# Patient Record
Sex: MALE | Race: WHITE
[De-identification: names, ages, dates, MRNs, and addresses within clinical notes are randomized per-mention and may not be internally consistent; named-entity substitution may affect disease eponyms.]

---

## 2019-02-08 ENCOUNTER — HOSPITAL ENCOUNTER (EMERGENCY)
Dept: HOSPITAL 47 - EC | Age: 34
LOS: 1 days | Discharge: HOME | End: 2019-02-09
Payer: COMMERCIAL

## 2019-02-08 VITALS — TEMPERATURE: 98.3 F

## 2019-02-08 DIAGNOSIS — F99: ICD-10-CM

## 2019-02-08 DIAGNOSIS — F17.200: ICD-10-CM

## 2019-02-08 DIAGNOSIS — F10.10: Primary | ICD-10-CM

## 2019-02-08 PROCEDURE — 82075 ASSAY OF BREATH ETHANOL: CPT

## 2019-02-08 PROCEDURE — 81003 URINALYSIS AUTO W/O SCOPE: CPT

## 2019-02-08 PROCEDURE — 80306 DRUG TEST PRSMV INSTRMNT: CPT

## 2019-02-08 PROCEDURE — 99284 EMERGENCY DEPT VISIT MOD MDM: CPT

## 2019-02-08 PROCEDURE — 93005 ELECTROCARDIOGRAM TRACING: CPT

## 2019-02-09 VITALS — DIASTOLIC BLOOD PRESSURE: 82 MMHG | SYSTOLIC BLOOD PRESSURE: 124 MMHG | HEART RATE: 96 BPM | RESPIRATION RATE: 16 BRPM

## 2019-02-09 LAB
PH UR: 5 [PH] (ref 5–8)
SP GR UR: 1 (ref 1–1.03)
UROBILINOGEN UR QL STRIP: <2 MG/DL (ref ?–2)

## 2019-02-09 NOTE — ED
Psych HPI





- General


Source: patient


Mode of arrival: ambulatory





- History of Present Illness


MD Complaint: other


Onset/Timin


-: week(s)


Associated Psychiatric Symptoms: depression, homicidal ideation


History of same: No


Quality: getting worse


Improves With: none


Worsens With: alcohol


Context: recent alcohol abuse


Associated Symptoms: denies other symptoms





<RonnellRicardo - Last Filed: 19 00:27>





<Jose Bernal - Last Filed: 19 11:20>





- General


Chief Complaint: Psychiatric Symptoms


Stated Complaint: mental health


Time Seen by Provider: 19 23:52





- History of Present Illness


Initial Comments: 





This patient is a 33-year-old man who presents with the complaint that he is 

having thoughts of hurting people.  The patient states that going back 2-3 

weeks now he is been getting increasingly depressed, and having thoughts of 

hurting people around him.  He denied specific homicidal ideation.  He denied 

suicidal ideation.  The patient states he has also been drinking a fair amount 

recently, 5-10 beers per day plus shots. (Ricardo Reynaga)





- Related Data


 Home Medications











 Medication  Instructions  Recorded  Confirmed


 


No Known Home Medications  19











 Allergies











Allergy/AdvReac Type Severity Reaction Status Date / Time


 


No Known Allergies Allergy   Verified 19 10:51














Review of Systems


ROS Other: All systems not noted in ROS Statement are negative.


Constitutional: Denies: fever


Respiratory: Denies: cough, dyspnea


Cardiovascular: Denies: chest pain, palpitations


Gastrointestinal: Denies: abdominal pain, vomiting, diarrhea


Genitourinary: Denies: dysuria, hematuria


Musculoskeletal: Denies: back pain


Skin: Denies: rash


Neurological: Denies: headache


Psychiatric: Reports: depression, homicidal thoughts.  Denies: auditory 

hallucinations, visual hallucinations, suicidal thoughts





<Ricardo Reynaga - Last Filed: 19 00:27>


ROS Other: All systems not noted in ROS Statement are negative.





<Jose Bernal - Last Filed: 19 11:20>


ROS Statement: 


Those systems with pertinent positive or pertinent negative responses have been 

documented in the HPI.








Past Medical History


Past Medical History: Asthma, Hypertension


Additional Past Medical History / Comment(s): back pain


History of Any Multi-Drug Resistant Organisms: None Reported


Past Surgical History: No Surgical Hx Reported


Past Psychological History: No Psychological Hx Reported


Smoking Status: Current every day smoker


Past Alcohol Use History: Daily


Past Drug Use History: None Reported





<Ricardo Reynaga - Last Filed: 19 00:27>





General Exam


Limitations: no limitations


General appearance: alert, in no apparent distress, appears intoxicated


Head exam: Present: atraumatic, normocephalic


Eye exam: Present: normal appearance.  Absent: scleral icterus, conjunctival 

injection


Respiratory exam: Present: normal lung sounds bilaterally.  Absent: respiratory 

distress, wheezes, rales, rhonchi, stridor


Cardiovascular Exam: Present: regular rate, normal rhythm, normal heart sounds.

  Absent: systolic murmur, diastolic murmur, rubs, gallop


GI/Abdominal exam: Present: soft.  Absent: distended, tenderness, guarding, 

rebound, mass


Extremities exam: Present: normal inspection.  Absent: pedal edema


Neurological exam: Present: alert


Psychiatric exam: Present: depressed, homicidal ideation.  Absent: agitated, 

anxious, flat affect, manic, suicidal ideation


Skin exam: Present: warm, dry, intact, normal color.  Absent: rash





<Ricardo Reynaga - Last Filed: 19 00:27>





 Vital Signs











  19





  23:40 05:48


 


Temperature 98.3 F 


 


Pulse Rate 115 H 96


 


Respiratory 20 16





Rate  


 


Blood Pressure 137/94 124/82


 


O2 Sat by Pulse 98 96





Oximetry  














- Lab Data





 Lab Results











  19 Range/Units





  23:55 


 


Urine Color  Colorless  


 


Urine Appearance  Clear  (Clear)  


 


Urine pH  5.0  (5.0-8.0)  


 


Ur Specific Gravity  1.000 L  (1.001-1.035)  


 


Urine Protein  Negative  (Negative)  


 


Urine Glucose (UA)  Negative  (Negative)  


 


Urine Ketones  Negative  (Negative)  


 


Urine Blood  Negative  (Negative)  


 


Urine Nitrite  Negative  (Negative)  


 


Urine Bilirubin  Negative  (Negative)  


 


Urine Urobilinogen  <2.0  (<2.0)  mg/dL


 


Ur Leukocyte Esterase  Negative  (Negative)  


 


Urine Opiates Screen  Not Detected  (NotDetected)  


 


Ur Oxycodone Screen  Not Detected  (NotDetected)  


 


Urine Methadone Screen  Not Detected  (NotDetected)  


 


Ur Propoxyphene Screen  Not Detected  (NotDetected)  


 


Ur Barbiturates Screen  Not Detected  (NotDetected)  


 


U Tricyclic Antidepress  Not Detected  (NotDetected)  


 


Ur Phencyclidine Scrn  Not Detected  (NotDetected)  


 


Ur Amphetamines Screen  Not Detected  (NotDetected)  


 


U Methamphetamines Scrn  Not Detected  (NotDetected)  


 


U Benzodiazepines Scrn  Not Detected  (NotDetected)  


 


Urine Cocaine Screen  Not Detected  (NotDetected)  


 


U Marijuana (THC) Screen  Not Detected  (NotDetected)  














Disposition





<Ricardo Reynaga - Last Filed: 19 00:27>


Is patient prescribed a controlled substance at d/c from ED?: No


Time of Disposition: 11:20





<Jose Bernal - Last Filed: 19 11:20>


Clinical Impression: 


 Alcohol abuse, Mood disturbance





Disposition: HOME SELF-CARE


Condition: Good


Instructions (If sedation given, give patient instructions):  Abuse of Alcohol (

ED)


Additional Instructions: 


Follow-up per [facility] directions


Referrals: 


Arya Roque MD [REFERRING] - 1-2 days

## 2021-05-26 ENCOUNTER — HOSPITAL ENCOUNTER (EMERGENCY)
Dept: HOSPITAL 47 - EC | Age: 36
Discharge: HOME | End: 2021-05-26
Payer: COMMERCIAL

## 2021-05-26 VITALS — DIASTOLIC BLOOD PRESSURE: 88 MMHG | SYSTOLIC BLOOD PRESSURE: 120 MMHG | HEART RATE: 88 BPM

## 2021-05-26 VITALS — TEMPERATURE: 97.7 F | RESPIRATION RATE: 16 BRPM

## 2021-05-26 DIAGNOSIS — I10: ICD-10-CM

## 2021-05-26 DIAGNOSIS — R07.89: Primary | ICD-10-CM

## 2021-05-26 DIAGNOSIS — J45.909: ICD-10-CM

## 2021-05-26 DIAGNOSIS — F17.200: ICD-10-CM

## 2021-05-26 DIAGNOSIS — I25.2: ICD-10-CM

## 2021-05-26 DIAGNOSIS — R11.10: ICD-10-CM

## 2021-05-26 LAB
ALBUMIN SERPL-MCNC: 3.7 G/DL (ref 3.5–5)
ALP SERPL-CCNC: 62 U/L (ref 38–126)
ALT SERPL-CCNC: 25 U/L (ref 4–49)
ANION GAP SERPL CALC-SCNC: 9 MMOL/L
APTT BLD: 23.4 SEC (ref 22–30)
AST SERPL-CCNC: 75 U/L (ref 17–59)
BASOPHILS # BLD AUTO: 0.2 K/UL (ref 0–0.2)
BASOPHILS NFR BLD AUTO: 2 %
BUN SERPL-SCNC: 4 MG/DL (ref 9–20)
CALCIUM SPEC-MCNC: 8.7 MG/DL (ref 8.4–10.2)
CHLORIDE SERPL-SCNC: 105 MMOL/L (ref 98–107)
CO2 SERPL-SCNC: 23 MMOL/L (ref 22–30)
EOSINOPHIL # BLD AUTO: 0.6 K/UL (ref 0–0.7)
EOSINOPHIL NFR BLD AUTO: 6 %
ERYTHROCYTE [DISTWIDTH] IN BLOOD BY AUTOMATED COUNT: 4.33 M/UL (ref 4.3–5.9)
ERYTHROCYTE [DISTWIDTH] IN BLOOD: 13.1 % (ref 11.5–15.5)
GLUCOSE SERPL-MCNC: 75 MG/DL (ref 74–99)
HCT VFR BLD AUTO: 43.3 % (ref 39–53)
HGB BLD-MCNC: 14.7 GM/DL (ref 13–17.5)
INR PPP: 0.9 (ref ?–1.2)
LYMPHOCYTES # SPEC AUTO: 3.5 K/UL (ref 1–4.8)
LYMPHOCYTES NFR SPEC AUTO: 34 %
MAGNESIUM SPEC-SCNC: 1.7 MG/DL (ref 1.6–2.3)
MCH RBC QN AUTO: 33.9 PG (ref 25–35)
MCHC RBC AUTO-ENTMCNC: 34 G/DL (ref 31–37)
MCV RBC AUTO: 99.9 FL (ref 80–100)
MONOCYTES # BLD AUTO: 0.6 K/UL (ref 0–1)
MONOCYTES NFR BLD AUTO: 6 %
NEUTROPHILS # BLD AUTO: 5.1 K/UL (ref 1.3–7.7)
NEUTROPHILS NFR BLD AUTO: 49 %
PLATELET # BLD AUTO: 205 K/UL (ref 150–450)
POTASSIUM SERPL-SCNC: 3.6 MMOL/L (ref 3.5–5.1)
PROT SERPL-MCNC: 6.4 G/DL (ref 6.3–8.2)
PT BLD: 9.7 SEC (ref 9–12)
SODIUM SERPL-SCNC: 137 MMOL/L (ref 137–145)
WBC # BLD AUTO: 10.4 K/UL (ref 3.8–10.6)

## 2021-05-26 PROCEDURE — 85025 COMPLETE CBC W/AUTO DIFF WBC: CPT

## 2021-05-26 PROCEDURE — 71046 X-RAY EXAM CHEST 2 VIEWS: CPT

## 2021-05-26 PROCEDURE — 84484 ASSAY OF TROPONIN QUANT: CPT

## 2021-05-26 PROCEDURE — 85610 PROTHROMBIN TIME: CPT

## 2021-05-26 PROCEDURE — 85730 THROMBOPLASTIN TIME PARTIAL: CPT

## 2021-05-26 PROCEDURE — 36415 COLL VENOUS BLD VENIPUNCTURE: CPT

## 2021-05-26 PROCEDURE — 83735 ASSAY OF MAGNESIUM: CPT

## 2021-05-26 PROCEDURE — 93005 ELECTROCARDIOGRAM TRACING: CPT

## 2021-05-26 PROCEDURE — 96374 THER/PROPH/DIAG INJ IV PUSH: CPT

## 2021-05-26 PROCEDURE — 99285 EMERGENCY DEPT VISIT HI MDM: CPT

## 2021-05-26 PROCEDURE — 80053 COMPREHEN METABOLIC PANEL: CPT

## 2021-05-26 NOTE — ED
Chest Pain HPI





- General


Chief Complaint: Chest Pain


Stated Complaint: Chest Pain


Time Seen by Provider: 21 21:03


Source: patient, EMS


Mode of arrival: EMS


Limitations: no limitations





- History of Present Illness


Initial Comments: 





This patient is a 35-year-old man who presents to be evaluated for chest pain 

that he states has been going on intermittently for one year.  Contrary to 

nursing notes he states it started in the morning today.


MD Complaint: chest pain


Onset/Timin


-: year(s)


Onset: during rest


Pain Location: left chest


Pain Radiation: none


Severity: moderate


Quality: aching


Consistency: intermittent


Improves With: nothing


Worsens With: nothing


Anginal Symptoms: vomiting


Treatments Prior to Arrival: none





- Related Data


                                Home Medications











 Medication  Instructions  Recorded  Confirmed


 


Pantoprazole Sodium [Protonix] 40 mg PO DAILY 21











                                    Allergies











Allergy/AdvReac Type Severity Reaction Status Date / Time


 


No Known Allergies Allergy   Verified 21 22:59














Review of Systems


ROS Statement: 


Those systems with pertinent positive or pertinent negative responses have been 

documented in the HPI.





ROS Other: All systems not noted in ROS Statement are negative.


Constitutional: Denies: fever, chills


Respiratory: Denies: cough, dyspnea


Cardiovascular: Reports: chest pain.  Denies: palpitations, orthopnea, edema, 

syncope


Gastrointestinal: Reports: vomiting.  Denies: abdominal pain, nausea, diarrhea, 

constipation, melena, hematochezia


Genitourinary: Denies: dysuria, hematuria


Musculoskeletal: Denies: back pain


Skin: Denies: rash


Neurological: Denies: headache, weakness, numbness





EKG Findings





- EKG Results:


EKG: interpreted by ZARA RASCON, sinus rhythm (Rate 90 bpm), normal axis, normal 

QRS, normal ST/T, no acute changes





Past Medical History


Past Medical History: Asthma, Hypertension, Myocardial Infarction (MI)


Additional Past Medical History / Comment(s): back pain


History of Any Multi-Drug Resistant Organisms: None Reported


Past Surgical History: No Surgical Hx Reported


Additional Past Surgical History / Comment(s): skin graft as child


Past Psychological History: No Psychological Hx Reported


Smoking Status: Current every day smoker


Past Alcohol Use History: Heavy


Past Drug Use History: None Reported





General Exam


Limitations: no limitations


General appearance: alert, in no apparent distress


Head exam: Present: atraumatic, normocephalic


Eye exam: Present: normal appearance.  Absent: scleral icterus, conjunctival 

injection


Neck exam: Present: normal inspection, full ROM


Respiratory exam: Present: normal lung sounds bilaterally, chest wall 

tenderness.  Absent: respiratory distress, wheezes, rales, rhonchi, stridor


Cardiovascular Exam: Present: regular rate, normal rhythm, normal heart sounds. 

 Absent: systolic murmur, diastolic murmur, rubs, gallop


GI/Abdominal exam: Present: soft, tenderness (Mild left upper quadrant 

tenderness no rebound or guarding).  Absent: distended, guarding, rebound, 

rigid, mass, pulsatile mass, hernia


Extremities exam: Present: normal inspection, normal capillary refill.  Absent: 

pedal edema, calf tenderness


Back exam: Present: normal inspection.  Absent: CVA tenderness (R), CVA 

tenderness (L)


Neurological exam: Present: alert


Skin exam: Present: warm, dry, intact, normal color.  Absent: rash





Course


                                   Vital Signs











  21





  20:51 22:17


 


Temperature 97.7 F 


 


Pulse Rate 92 


 


Pulse Rate [  75





Cardiac Monitor  





]  


 


Respiratory 16 





Rate  


 


Blood Pressure 130/91 


 


O2 Sat by Pulse 96 





Oximetry  














Disposition


Clinical Impression: 


 Chest pain





Disposition: HOME SELF-CARE


Condition: Good


Instructions (If sedation given, give patient instructions):  Chest Pain (ED)


Is patient prescribed a controlled substance at d/c from ED?: No


Referrals: 


Arya Roque MD [Primary Care Provider] - 1-2 days


Colleen Trevizo MD [STAFF PHYSICIAN] - 1-2 days

## 2021-11-10 ENCOUNTER — HOSPITAL ENCOUNTER (INPATIENT)
Dept: HOSPITAL 47 - EC | Age: 36
LOS: 1 days | Discharge: HOME | DRG: 897 | End: 2021-11-11
Attending: HOSPITALIST | Admitting: HOSPITALIST
Payer: COMMERCIAL

## 2021-11-10 DIAGNOSIS — F10.239: ICD-10-CM
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
## 2021-11-10 DIAGNOSIS — I10: ICD-10-CM

## 2021-11-10 DIAGNOSIS — R07.89: ICD-10-CM

## 2021-11-10 DIAGNOSIS — Z71.41: ICD-10-CM

## 2021-11-10 DIAGNOSIS — I25.2: ICD-10-CM

## 2021-11-10 DIAGNOSIS — K21.9: ICD-10-CM

## 2021-11-10 DIAGNOSIS — Z20.822: ICD-10-CM

## 2021-11-10 DIAGNOSIS — Z79.899: ICD-10-CM

## 2021-11-10 DIAGNOSIS — F10.229: Primary | ICD-10-CM

## 2021-11-10 DIAGNOSIS — Z82.49: ICD-10-CM

## 2021-11-10 DIAGNOSIS — R79.1: ICD-10-CM

## 2021-11-10 DIAGNOSIS — Z98.890: ICD-10-CM

## 2021-11-10 DIAGNOSIS — R00.0: ICD-10-CM

## 2021-11-10 DIAGNOSIS — Z86.73: ICD-10-CM

## 2021-11-10 DIAGNOSIS — F17.210: ICD-10-CM

## 2021-11-10 DIAGNOSIS — J45.909: ICD-10-CM

## 2021-11-10 LAB
ALBUMIN SERPL-MCNC: 4.2 G/DL (ref 3.5–5)
ALP SERPL-CCNC: 49 U/L (ref 38–126)
ALT SERPL-CCNC: 16 U/L (ref 4–49)
ANION GAP SERPL CALC-SCNC: 9 MMOL/L
APTT BLD: 24.6 SEC (ref 22–30)
AST SERPL-CCNC: 48 U/L (ref 17–59)
BASOPHILS # BLD AUTO: 0.1 K/UL (ref 0–0.2)
BASOPHILS NFR BLD AUTO: 1 %
BUN SERPL-SCNC: 5 MG/DL (ref 9–20)
CALCIUM SPEC-MCNC: 9.4 MG/DL (ref 8.4–10.2)
CHLORIDE SERPL-SCNC: 106 MMOL/L (ref 98–107)
CO2 SERPL-SCNC: 26 MMOL/L (ref 22–30)
EOSINOPHIL # BLD AUTO: 0.3 K/UL (ref 0–0.7)
EOSINOPHIL NFR BLD AUTO: 3 %
ERYTHROCYTE [DISTWIDTH] IN BLOOD BY AUTOMATED COUNT: 4.88 M/UL (ref 4.3–5.9)
ERYTHROCYTE [DISTWIDTH] IN BLOOD: 12.8 % (ref 11.5–15.5)
GLUCOSE SERPL-MCNC: 102 MG/DL (ref 74–99)
HCT VFR BLD AUTO: 49.2 % (ref 39–53)
HGB BLD-MCNC: 16.3 GM/DL (ref 13–17.5)
INR PPP: 0.9 (ref ?–1.2)
LIPASE SERPL-CCNC: 157 U/L (ref 23–300)
LYMPHOCYTES # SPEC AUTO: 2.3 K/UL (ref 1–4.8)
LYMPHOCYTES NFR SPEC AUTO: 24 %
MCH RBC QN AUTO: 33.5 PG (ref 25–35)
MCHC RBC AUTO-ENTMCNC: 33.3 G/DL (ref 31–37)
MCV RBC AUTO: 100.8 FL (ref 80–100)
MONOCYTES # BLD AUTO: 0.5 K/UL (ref 0–1)
MONOCYTES NFR BLD AUTO: 5 %
NEUTROPHILS # BLD AUTO: 6.2 K/UL (ref 1.3–7.7)
NEUTROPHILS NFR BLD AUTO: 63 %
PLATELET # BLD AUTO: 294 K/UL (ref 150–450)
POTASSIUM SERPL-SCNC: 4 MMOL/L (ref 3.5–5.1)
PROT SERPL-MCNC: 7.5 G/DL (ref 6.3–8.2)
PT BLD: 9.9 SEC (ref 9–12)
SODIUM SERPL-SCNC: 141 MMOL/L (ref 137–145)
WBC # BLD AUTO: 9.8 K/UL (ref 3.8–10.6)

## 2021-11-10 PROCEDURE — 85730 THROMBOPLASTIN TIME PARTIAL: CPT

## 2021-11-10 PROCEDURE — 78582 LUNG VENTILAT&PERFUS IMAGING: CPT

## 2021-11-10 PROCEDURE — 85379 FIBRIN DEGRADATION QUANT: CPT

## 2021-11-10 PROCEDURE — 99285 EMERGENCY DEPT VISIT HI MDM: CPT

## 2021-11-10 PROCEDURE — 80053 COMPREHEN METABOLIC PANEL: CPT

## 2021-11-10 PROCEDURE — 93306 TTE W/DOPPLER COMPLETE: CPT

## 2021-11-10 PROCEDURE — 81003 URINALYSIS AUTO W/O SCOPE: CPT

## 2021-11-10 PROCEDURE — 87635 SARS-COV-2 COVID-19 AMP PRB: CPT

## 2021-11-10 PROCEDURE — 85610 PROTHROMBIN TIME: CPT

## 2021-11-10 PROCEDURE — 84484 ASSAY OF TROPONIN QUANT: CPT

## 2021-11-10 PROCEDURE — 74177 CT ABD & PELVIS W/CONTRAST: CPT

## 2021-11-10 PROCEDURE — 80320 DRUG SCREEN QUANTALCOHOLS: CPT

## 2021-11-10 PROCEDURE — 80048 BASIC METABOLIC PNL TOTAL CA: CPT

## 2021-11-10 PROCEDURE — 71046 X-RAY EXAM CHEST 2 VIEWS: CPT

## 2021-11-10 PROCEDURE — 36415 COLL VENOUS BLD VENIPUNCTURE: CPT

## 2021-11-10 PROCEDURE — 85025 COMPLETE CBC W/AUTO DIFF WBC: CPT

## 2021-11-10 PROCEDURE — 83690 ASSAY OF LIPASE: CPT

## 2021-11-10 PROCEDURE — 96374 THER/PROPH/DIAG INJ IV PUSH: CPT

## 2021-11-10 PROCEDURE — 83605 ASSAY OF LACTIC ACID: CPT

## 2021-11-10 PROCEDURE — 93005 ELECTROCARDIOGRAM TRACING: CPT

## 2021-11-10 RX ADMIN — METOPROLOL TARTRATE SCH MG: 25 TABLET, FILM COATED ORAL at 20:28

## 2021-11-10 RX ADMIN — CEFAZOLIN SCH MLS/HR: 330 INJECTION, POWDER, FOR SOLUTION INTRAMUSCULAR; INTRAVENOUS at 20:29

## 2021-11-10 NOTE — ED
Abdominal Pain HPI





- General


Chief Complaint: Abdominal Pain


Stated Complaint: chest pain


Source: patient


Mode of arrival: ambulatory


Limitations: no limitations





- History of Present Illness


Initial Comments: 





36-year-old male with past history of asthma, hypertension, CVA and 2 reported 

MIs who presents to the emergency department as reported chest pain.  States 

that the pain is located over the left side of his chest wall and into the left 

upper quadrant of his abdomen.  Pain has been going on for the past month 

however states that it became acutely worse today.  He also feels fatigued.  

States he's been sleeping all day.  He denies cough, fevers or congestion.  

Admits to nausea without vomiting.  No history of abdominal surgeries.  Denies 

history of peptic ulcer disease.  Patient denies alcohol intake.  Only admits to

tobacco use.  No history of EGD.  He denies hematemesis.  Admits diarrhea.  No 

black or bloody stools.  Patient arrives with a high heart rate.  No other 

alleviating, precipitating or modifying factors





- Related Data


                                Home Medications











 Medication  Instructions  Recorded  Confirmed


 


Pantoprazole Sodium [Protonix] 40 mg PO DAILY 05/26/21 11/10/21











                                    Allergies











Allergy/AdvReac Type Severity Reaction Status Date / Time


 


No Known Allergies Allergy   Verified 11/10/21 16:55














Review of Systems


ROS Statement: 


Those systems with pertinent positive or pertinent negative responses have been 

documented in the HPI.





ROS Other: All systems not noted in ROS Statement are negative.





Past Medical History


Past Medical History: Asthma, Hypertension, Myocardial Infarction (MI)


Additional Past Medical History / Comment(s): back pain


History of Any Multi-Drug Resistant Organisms: None Reported


Past Surgical History: No Surgical Hx Reported


Additional Past Surgical History / Comment(s): skin graft as child


Past Psychological History: No Psychological Hx Reported


Smoking Status: Current every day smoker


Past Alcohol Use History: Occasional


Past Drug Use History: None Reported





General Exam


Limitations: no limitations





Course


                                   Vital Signs











  11/10/21 11/10/21 11/10/21





  16:05 19:02 20:32


 


Temperature 98.5 F  


 


Pulse Rate 124 H 116 H 107 H


 


Respiratory 20 18 18





Rate   


 


Blood Pressure 144/85 140/97 138/94


 


O2 Sat by Pulse 99 98 98





Oximetry   














Medical Decision Making





- Medical Decision Making





On arrival patient is placed into room 23.  A thorough history and physical exam

 is performed.  IV is established.  Patient is given a liter bolus of normal 

saline.  He does smell of alcohol.  Laboratory studies are obtained and 

demonstrated an elevated d-dimer of 0.71 and alcohol of 223.  He went over for a

 CT of his abdomen and pelvis before d-dimer came back.  CT demonstrated no 

acute findings.  Chest x-ray demonstrated no acute findings.  The patient does 

have persistent tachycardia however improved to a rate of 110.  Due to his 

elevated d-dimer I do feel that the patient needs a lung perfusion scan due to 

chest pain and persistent tachycardia.  He she will be given a dose of Lovenox 

at this time with lung perfusion scan pending.  Recommended admission for which 

Dr. Colon accepted and agreed to treatment plan.  He is currently awaiting a 

bed on the floor





- Lab Data


Result diagrams: 


                                 11/10/21 17:26





                                 11/10/21 17:26


                                   Lab Results











  11/10/21 11/10/21 11/10/21 Range/Units





  17:26 17:26 17:26 


 


WBC  9.8    (3.8-10.6)  k/uL


 


RBC  4.88    (4.30-5.90)  m/uL


 


Hgb  16.3    (13.0-17.5)  gm/dL


 


Hct  49.2    (39.0-53.0)  %


 


MCV  100.8 H    (80.0-100.0)  fL


 


MCH  33.5    (25.0-35.0)  pg


 


MCHC  33.3    (31.0-37.0)  g/dL


 


RDW  12.8    (11.5-15.5)  %


 


Plt Count  294    (150-450)  k/uL


 


MPV  7.1    


 


Neutrophils %  63    %


 


Lymphocytes %  24    %


 


Monocytes %  5    %


 


Eosinophils %  3    %


 


Basophils %  1    %


 


Neutrophils #  6.2    (1.3-7.7)  k/uL


 


Lymphocytes #  2.3    (1.0-4.8)  k/uL


 


Monocytes #  0.5    (0-1.0)  k/uL


 


Eosinophils #  0.3    (0-0.7)  k/uL


 


Basophils #  0.1    (0-0.2)  k/uL


 


PT   9.9   (9.0-12.0)  sec


 


INR   0.9   (<1.2)  


 


APTT   24.6   (22.0-30.0)  sec


 


D-Dimer   0.71 H   (<0.60)  mg/L FEU


 


Sodium    141  (137-145)  mmol/L


 


Potassium    4.0  (3.5-5.1)  mmol/L


 


Chloride    106  ()  mmol/L


 


Carbon Dioxide    26  (22-30)  mmol/L


 


Anion Gap    9  mmol/L


 


BUN    5 L  (9-20)  mg/dL


 


Creatinine    0.67  (0.66-1.25)  mg/dL


 


Est GFR (CKD-EPI)AfAm    >90  (>60 ml/min/1.73 sqM)  


 


Est GFR (CKD-EPI)NonAf    >90  (>60 ml/min/1.73 sqM)  


 


Glucose    102 H  (74-99)  mg/dL


 


Plasma Lactic Acid [place]     (0.7-2.0)  mmol/L


 


Calcium    9.4  (8.4-10.2)  mg/dL


 


Total Bilirubin    0.3  (0.2-1.3)  mg/dL


 


AST    48  (17-59)  U/L


 


ALT    16  (4-49)  U/L


 


Alkaline Phosphatase    49  ()  U/L


 


Troponin I     (0.000-0.034)  ng/mL


 


Total Protein    7.5  (6.3-8.2)  g/dL


 


Albumin    4.2  (3.5-5.0)  g/dL


 


Lipase    157  ()  U/L


 


Serum Alcohol    223 H*  mg/dL














  11/10/21 11/10/21 Range/Units





  17:26 17:26 


 


WBC    (3.8-10.6)  k/uL


 


RBC    (4.30-5.90)  m/uL


 


Hgb    (13.0-17.5)  gm/dL


 


Hct    (39.0-53.0)  %


 


MCV    (80.0-100.0)  fL


 


MCH    (25.0-35.0)  pg


 


MCHC    (31.0-37.0)  g/dL


 


RDW    (11.5-15.5)  %


 


Plt Count    (150-450)  k/uL


 


MPV    


 


Neutrophils %    %


 


Lymphocytes %    %


 


Monocytes %    %


 


Eosinophils %    %


 


Basophils %    %


 


Neutrophils #    (1.3-7.7)  k/uL


 


Lymphocytes #    (1.0-4.8)  k/uL


 


Monocytes #    (0-1.0)  k/uL


 


Eosinophils #    (0-0.7)  k/uL


 


Basophils #    (0-0.2)  k/uL


 


PT    (9.0-12.0)  sec


 


INR    (<1.2)  


 


APTT    (22.0-30.0)  sec


 


D-Dimer    (<0.60)  mg/L FEU


 


Sodium    (137-145)  mmol/L


 


Potassium    (3.5-5.1)  mmol/L


 


Chloride    ()  mmol/L


 


Carbon Dioxide    (22-30)  mmol/L


 


Anion Gap    mmol/L


 


BUN    (9-20)  mg/dL


 


Creatinine    (0.66-1.25)  mg/dL


 


Est GFR (CKD-EPI)AfAm    (>60 ml/min/1.73 sqM)  


 


Est GFR (CKD-EPI)NonAf    (>60 ml/min/1.73 sqM)  


 


Glucose    (74-99)  mg/dL


 


Plasma Lactic Acid [place]  2.0   (0.7-2.0)  mmol/L


 


Calcium    (8.4-10.2)  mg/dL


 


Total Bilirubin    (0.2-1.3)  mg/dL


 


AST    (17-59)  U/L


 


ALT    (4-49)  U/L


 


Alkaline Phosphatase    ()  U/L


 


Troponin I   <0.012  (0.000-0.034)  ng/mL


 


Total Protein    (6.3-8.2)  g/dL


 


Albumin    (3.5-5.0)  g/dL


 


Lipase    ()  U/L


 


Serum Alcohol    mg/dL














- EKG Data


EKG Comments: 





EKG demonstrates sinus tachycardia with a rate of 113. CO interval 132.  She has

 90.  QTC of 474.  No acute ST segment elevations or depressions





Disposition


Clinical Impression: 


 Chest pain, Tachycardia, Elevated d-dimer





Disposition: ADMITTED AS IP TO THIS HOSP


Condition: Stable


Is patient prescribed a controlled substance at d/c from ED?: No


Decision to Admit Reason: Admit from EC


Decision Date: 11/10/21


Decision Time: 19:48

## 2021-11-10 NOTE — XR
EXAMINATION TYPE: XR chest 2V

 

DATE OF EXAM: 11/10/2021

 

COMPARISON: 5/26/2021

 

HISTORY: Abdominal pain

 

TECHNIQUE: 2 views

 

FINDINGS: Heart and mediastinum are normal. Lungs are clear. Diaphragm is normal. Bony thorax is inta
ct. There are chest leads.

 

IMPRESSION: Normal chest. No change.

## 2021-11-11 VITALS
SYSTOLIC BLOOD PRESSURE: 124 MMHG | HEART RATE: 93 BPM | DIASTOLIC BLOOD PRESSURE: 77 MMHG | TEMPERATURE: 98.3 F | RESPIRATION RATE: 13 BRPM

## 2021-11-11 LAB
ANION GAP SERPL CALC-SCNC: 3 MMOL/L
BASOPHILS # BLD AUTO: 0.1 K/UL (ref 0–0.2)
BASOPHILS NFR BLD AUTO: 1 %
BUN SERPL-SCNC: 3 MG/DL (ref 9–20)
CALCIUM SPEC-MCNC: 8.5 MG/DL (ref 8.4–10.2)
CHLORIDE SERPL-SCNC: 106 MMOL/L (ref 98–107)
CO2 SERPL-SCNC: 27 MMOL/L (ref 22–30)
EOSINOPHIL # BLD AUTO: 0.3 K/UL (ref 0–0.7)
EOSINOPHIL NFR BLD AUTO: 2 %
ERYTHROCYTE [DISTWIDTH] IN BLOOD BY AUTOMATED COUNT: 4.28 M/UL (ref 4.3–5.9)
ERYTHROCYTE [DISTWIDTH] IN BLOOD: 12.8 % (ref 11.5–15.5)
GLUCOSE SERPL-MCNC: 86 MG/DL (ref 74–99)
HCT VFR BLD AUTO: 43.5 % (ref 39–53)
HGB BLD-MCNC: 14.6 GM/DL (ref 13–17.5)
LYMPHOCYTES # SPEC AUTO: 2.8 K/UL (ref 1–4.8)
LYMPHOCYTES NFR SPEC AUTO: 24 %
MCH RBC QN AUTO: 34.2 PG (ref 25–35)
MCHC RBC AUTO-ENTMCNC: 33.6 G/DL (ref 31–37)
MCV RBC AUTO: 101.8 FL (ref 80–100)
MONOCYTES # BLD AUTO: 0.6 K/UL (ref 0–1)
MONOCYTES NFR BLD AUTO: 5 %
NEUTROPHILS # BLD AUTO: 7.7 K/UL (ref 1.3–7.7)
NEUTROPHILS NFR BLD AUTO: 66 %
PH UR: 6 [PH] (ref 5–8)
PLATELET # BLD AUTO: 254 K/UL (ref 150–450)
POTASSIUM SERPL-SCNC: 3.6 MMOL/L (ref 3.5–5.1)
SODIUM SERPL-SCNC: 136 MMOL/L (ref 137–145)
SP GR UR: 1.03 (ref 1–1.03)
UROBILINOGEN UR QL STRIP: <2 MG/DL (ref ?–2)
WBC # BLD AUTO: 11.6 K/UL (ref 3.8–10.6)

## 2021-11-11 RX ADMIN — CEFAZOLIN SCH: 330 INJECTION, POWDER, FOR SOLUTION INTRAMUSCULAR; INTRAVENOUS at 03:57

## 2021-11-11 RX ADMIN — METOPROLOL TARTRATE SCH MG: 25 TABLET, FILM COATED ORAL at 08:10

## 2021-11-11 NOTE — P.CRDCN
History of Present Illness


History of present illness: 


HISTORY OF PRESENTING ILLNESS


This is a pleasant 36-year-old male past medical history significant for asthma,

alcohol use, chronic nicotine dependence (smokes 1PPD), hypertension (states he 

is not on medication), 3 strokes per patient (unknown details), possible cardiac

arrest in [place] he states 1-2 years ago? Patient unable to give accurate 

details of this incident. He does not follow with a cardiologist.  We have been 

asked to see in consultation for chest pain. Patient presents emergency 

department with left upper quadrant abdominal pain for about 2 weeks that has 

progressively been getting worse. He states it is aggravated by activity and 

palpation to his LUQ. His pain is non-radiating. He denies chest pain. He states

it is associated with mild shortness of breath and nausea. He also does endorse 

symptoms of orthopnea. He denies history of coronary artery disease or stent 

placements, heart failure or diabetes. Family history includes grandfather had 

an MI unsure of what age. He states about 1-2 years ago he was in [place] and 

was told his "heart stopped" he is unaware of any details. He is a current every

day smoker smokes 1PPD. He states he does drink alcohol about 6pack Friday and 

Saturday, however he does state he did drink a 12oz beer yesterday. He denies 

daily alcohol use. Denies illicit drug use. Patient plan to go for 


VQ scan today





DIAGNOSTICS


EKG reveals sinus tachycardia, heart rate 113, LVH, no significant ST-T wave 

abnormalities


Telemetry tracings indicate sinus mechanism HR 80s-low 100s. 


CT abdomen and Pelvis- no acute findings 


Chest xray no acute cardiopulmonary process


Laboratory reviewed, WBC 11.6, hemoglobin 14.6, platelets 254, sodium 136, 

potassium 3.6, BUN 3, serum current 0.5, troponin negative 3, UA negative, 

serum alcohol 223, COVID-19 PCR negative


Current home medications include Protonix.  Patient started on PRN ativan and 

metoprolol tartrate 12.5mg BID. 





REVIEW OF SYSTEMS


At the time of my exam:


CONSTITUTIONAL: Denies fever or chills.


CARDIOVASCULAR: +shortness of breath, +orthopnea Denies chest pain, PND or 

palpitations.


RESPIRATORY: Denies cough. 


GASTROINTESTINAL: +LUQ abdominal pain, Denies diarrhea, constipation, nausea or 

vomiting.


MUSCULOSKELETAL: Denies myalgias.


NEUROLOGIC: Denies numbness, tingling, headacbe or weakness.


ENDOCRINE: Denies fatigue, weight change,  polydipsia or polyurina.


GENITOURINARY: Denies burning, hematuria or urgency with micturation.


HEMATOLOGIC: Denies history of anemia or bleeding. 





PHYSICAL EXAMINATION


Blood pressure 124/77, heart rate 93, afebrile oxygen saturation 94% on room air


CONSTITUTIONAL: No apparent distress. 


HEENT: Head is normocephalic. Pupils are equal, round. Sclerae anicteric. Mucous

membranes of the mouth are moist.  No JVD. No carotid bruit.


CHEST EXAMINATION: Lungs are clear to auscultation. No chest wall tenderness is 

noted on palpation or with deep breathing. 


HEART EXAMINATION: Regular, tachycardic  rate and rhythm. S1, S2 heard. No 

murmurs, gallops or rub.


ABDOMEN: Soft, nontender. Positive bowel sounds.


EXTREMITIES: 2+ peripheral pulses, no lower extremity edema and no calf 

tenderness.


NEUROLOGIC EXAMINATION: Patient is awake, alert and oriented x3. 





ASSESSMENT


LUQ abdominal pain 


Sinus Tachycardia


History of Asthma 


History of hypertension


Alcohol use


Alcohol withdrawal 


Chronic nicotine dependence


History of CVA- patient reports history of 3 CVAs, unable to give details  





PLAN


An acute coronary event has been ruled out with no EKG evidence of ischemia and 

negative cardiac enzymes. Patient denies any chest pain on exam. 


Obtain 2D echocardiogram and doppler study to assess cardiac structure and 

function. 


Smoking and alcohol cessation discussed and highly recommended. 


If echocardiogram with no acute findings, no further cardiac workup indicated at

this time. 


Rest of management per primary. 


Thank you kindly for this consultation. 








Nurse Practitioner note has been reviewed, I agree with a documented findings 

and plan of care.  Patient was seen and examined.











Past Medical History


Past Medical History: Asthma, Hypertension, Myocardial Infarction (MI)


Additional Past Medical History / Comment(s): back pain


Last Myocardial Infarction Date:: 2020


History of Any Multi-Drug Resistant Organisms: None Reported


Past Surgical History: No Surgical Hx Reported


Additional Past Surgical History / Comment(s): skin graft as child


Past Psychological History: No Psychological Hx Reported


Smoking Status: Current every day smoker


Past Alcohol Use History: Occasional


Past Drug Use History: None Reported





Medications and Allergies


                                Home Medications











 Medication  Instructions  Recorded  Confirmed  Type


 


Pantoprazole Sodium [Protonix] 40 mg PO DAILY 05/26/21 11/10/21 History








                                    Allergies











Allergy/AdvReac Type Severity Reaction Status Date / Time


 


No Known Allergies Allergy   Verified 11/10/21 16:55














Physical Exam


Vitals: 


                                   Vital Signs











  Temp Pulse Pulse Resp BP BP Pulse Ox


 


 11/11/21 06:51  98.3 F   93  13   124/77  94 L


 


 11/11/21 01:39  98.1 F   97  20   133/70  92 L


 


 11/10/21 23:10  98.3 F   99  22   160/93  97


 


 11/10/21 20:32   107 H   18  138/94   98


 


 11/10/21 19:02   116 H   18  140/97   98


 


 11/10/21 16:05  98.5 F  124 H   20  144/85   99








                                Intake and Output











 11/10/21 11/11/21 11/11/21





 22:59 06:59 14:59


 


Other:   


 


  Voiding Method  Toilet 





  Urinal 


 


  # Voids  2 


 


  Weight 71.668 kg  














Results





                                 11/11/21 03:16





                                 11/11/21 03:16


                                 Cardiac Enzymes











  11/10/21 11/10/21 11/10/21 Range/Units





  17:26 17:26 21:13 


 


AST  48    (17-59)  U/L


 


Troponin I   <0.012  <0.012  (0.000-0.034)  ng/mL














  11/11/21 Range/Units





  03:16 


 


AST   (17-59)  U/L


 


Troponin I  <0.012  (0.000-0.034)  ng/mL








                                   Coagulation











  11/10/21 Range/Units





  17:26 


 


PT  9.9  (9.0-12.0)  sec


 


APTT  24.6  (22.0-30.0)  sec








                                       CBC











  11/10/21 11/11/21 Range/Units





  17:26 03:16 


 


WBC  9.8  11.6 H  (3.8-10.6)  k/uL


 


RBC  4.88  4.28 L  (4.30-5.90)  m/uL


 


Hgb  16.3  14.6  (13.0-17.5)  gm/dL


 


Hct  49.2  43.5  (39.0-53.0)  %


 


Plt Count  294  254  (150-450)  k/uL








                          Comprehensive Metabolic Panel











  11/10/21 11/11/21 Range/Units





  17:26 03:16 


 


Sodium  141  136 L  (137-145)  mmol/L


 


Potassium  4.0  3.6  (3.5-5.1)  mmol/L


 


Chloride  106  106  ()  mmol/L


 


Carbon Dioxide  26  27  (22-30)  mmol/L


 


BUN  5 L  3 L  (9-20)  mg/dL


 


Creatinine  0.67  0.59 L  (0.66-1.25)  mg/dL


 


Glucose  102 H  86  (74-99)  mg/dL


 


Calcium  9.4  8.5  (8.4-10.2)  mg/dL


 


AST  48   (17-59)  U/L


 


ALT  16   (4-49)  U/L


 


Alkaline Phosphatase  49   ()  U/L


 


Total Protein  7.5   (6.3-8.2)  g/dL


 


Albumin  4.2   (3.5-5.0)  g/dL








                               Current Medications











Generic Name Dose Route Start Last Admin





  Trade Name Freq  PRN Reason Stop Dose Admin


 


Diazepam  2 mg  11/10/21 22:00  11/11/21 08:10





  Diazepam 2 Mg Tab  PO   2 mg





  TID MUKESH   Administration


 


Sodium Chloride  1,000 mls @ 130 mls/hr  11/10/21 20:00  11/11/21 03:57





  Saline 0.9%  IV   Not Given





  .[id number] MUKESH  


 


Lorazepam  1 mg  11/10/21 19:46  11/10/21 23:30





  Lorazepam 2 Mg/Ml Inj  IV   1 mg





  Q2HR PRN   Administration





  CIWA 8 or 9  


 


Lorazepam  1 mg  11/10/21 19:46 





  Lorazepam 2 Mg/Ml Inj  IV  





  Q1HR PRN  





  CIWA 10 to 15  


 


Lorazepam  2 mg  11/10/21 19:46 





  Lorazepam 2 Mg/Ml Inj  IV  11/12/21 19:47 





  Q10M PRN  





  CIWA 16 or higher  


 


Metoprolol Tartrate  12.5 mg  11/10/21 21:00  11/11/21 08:10





  Metoprolol Tartrate 12.5 Mg Tab  PO   12.5 mg





  BID MUKESH   Administration


 


Naloxone HCl  0.2 mg  11/10/21 19:48 





  Naloxone 0.4 Mg/Ml 1 Ml Vial  IV  





  Q2M PRN  





  Opioid Reversal  


 


Thiamine HCl  100 mg  11/11/21 07:30  11/11/21 08:11





  Thiamine 100 Mg Tab  PO   100 mg





  BID-W/MEALS MUKESH   Administration








                                Intake and Output











 11/10/21 11/11/21 11/11/21





 22:59 06:59 14:59


 


Other:   


 


  Voiding Method  Toilet 





  Urinal 


 


  # Voids  2 


 


  Weight 71.668 kg  








                                        





                                 11/11/21 03:16 





                                 11/11/21 03:16

## 2021-11-11 NOTE — P.HPIM
History of Present Illness


H&P Date: 11/11/21


Chief Complaint: Chest pain





This is a pleasant 6-year-old patient of Dr. Roque.  Patient long-standing 

smoker.  Has been drinking alcohol since the age of about 8.  Drinks about 612 

ounce beers average per day.  Works as a [employer]  Patient presented 

yesterday after feeling tired dizzy.  It was described as chest pressure but 

actually was complaining of discomfort in the epigastric area.  Patient did run 

out of his Protonix.  Has been having reflux symptoms.  No precordial pain.  

Patient's alcohol level in the ER was 223.  Patient only eats one meal a day.  H

daniela was having early alcohol withdrawal symptoms.  Was put on Valium and CIWA 

scale in the ER.  This morning feeling better.  Very slight tremors.  Able to 

tolerate some breakfast.  No chest pain.





Review of systems:


GEN.:  Tired on presentation


EYES: None


HEENT: None


NECK: None


RESPIRATORY: None


CARDIOVASCULAR: None


GASTROINTESTINAL: Reflux


GENITOURINARY: None


MUSCULOSKELETAL: None


LYMPHATICS: None


HEMATOLOGICAL: None  


PSYCHIATRY: None


NEUROLOGICAL: Mild tremors





Past medical history to include:


Asthma, hypertension questionable, questionable MI.





Social history:


Lives with his sister and mother.  Works as a ".  Smokes a pack a day 

for about 28 years.  Has been drinking since the age of 8 about 6 beers a day 

of 12 ounce.





Family history:


Reviewed, noncontributory to presentation





Physical examination:


VITAL SIGNS: 98.5, 124, 20, 140/85, 99% room air upon presentation


GENERAL: BMI 24, sitting up in bed, awake.


EYES: Pupils equal.  Conjunctiva normal.


HEENT: External appearance of nose and ears normal, oral cavity grossly normal.


NECK: JVD not raised; masses not palpable.


HEART: First and second heart sounds are normal;  no edema.  


LUNGS: Respiratory rate normal; clear to auscultation.  


ABDOMEN: Soft,  nontender, liver spleen not palpable, no masses palpable.  


PSYCH: Alert and oriented x3;  mood  and affect slightly anxiousl.  


NEUROLOGICAL: Cranial nerves grossly intact; no facial asymmetry,   power and 

sensation grossly intact mild tremor of the outstretched hand. 


LYMPHATICS: No lymph nodes palpable in the axilla and neck





INVESTIGATIONS, reviewed in the clinical context:


White count 9.81 was 16.3 platelets 24 potassium 4 BUN 5 creatinine 0.67


Troponin I 3 negative


Lipase 157


UA: Negative


Serum alcohol 223


Coronavirus [PCL]: Not detected


EKG tracing personally reviewed by me-normal sinus rhythm.


Chest x-ray film personally reviewed by me-no infiltrates


CT abdomen pelvis: Negative





Assessment and plan:





-Early alcohol withdrawal syndrome


Valium 2 mg 3 times a day.  CIWA scale.  Lopressor 12.5 by mouth twice a day to 

cut back on [address]





-Acute alcohol intoxication with level of 223 on presentation





-Chronic alcohol dependence disorder


Patient counseled





-Chronic nicotine dependence, cigarette smoking


Nicotine patch 21





-GERD, uncontrolled


Protonix.  Avoid alcohol





Patient was put on Valium.  CIWA scale.  Nicotine patch.  Protonix.  Counseled. 

 Cardiology consulted.  2-D echo ordered.





Smoke cessation counseling:


This was done with the patient.  Nicotine patch is being given.  More than 3 

minutes was spent for this





Past Medical History


Past Medical History: Asthma, Hypertension, Myocardial Infarction (MI)


Additional Past Medical History / Comment(s): back pain


Last Myocardial Infarction Date:: 2020


History of Any Multi-Drug Resistant Organisms: None Reported


Past Surgical History: No Surgical Hx Reported


Additional Past Surgical History / Comment(s): skin graft as child


Past Psychological History: No Psychological Hx Reported


Smoking Status: Current every day smoker


Past Alcohol Use History: Occasional


Past Drug Use History: None Reported





Medications and Allergies


                                Home Medications











 Medication  Instructions  Recorded  Confirmed  Type


 


Pantoprazole Sodium [Protonix] 40 mg PO DAILY 05/26/21 11/10/21 History


 


Metoprolol Tartrate [Lopressor] 12.5 mg PO BID #6 tab 11/11/21  Rx


 


Nicotine 21Mg/24Hr Patch [Habitrol] 1 each TRANSDERM DAILY #14 patch 11/11/21  

Rx


 


Thiamine [Vitamin B-1] 100 mg PO BID-W/MEALS #60 tab 11/11/21  Rx








                                    Allergies











Allergy/AdvReac Type Severity Reaction Status Date / Time


 


No Known Allergies Allergy   Verified 11/10/21 16:55














Physical Exam


Vitals: 


                                   Vital Signs











  Temp Pulse Pulse Resp BP BP Pulse Ox


 


 11/11/21 06:51  98.3 F   93  13   124/77  94 L


 


 11/11/21 01:39  98.1 F   97  20   133/70  92 L


 


 11/10/21 23:10  98.3 F   99  22   160/93  97


 


 11/10/21 20:32   107 H   18  138/94   98


 


 11/10/21 19:02   116 H   18  140/97   98


 


 11/10/21 16:05  98.5 F  124 H   20  144/85   99








                                Intake and Output











 11/10/21 11/11/21 11/11/21





 22:59 06:59 14:59


 


Other:   


 


  Voiding Method  Toilet 





  Urinal 


 


  # Voids  2 


 


  Weight 71.668 kg  














Results


CBC & Chem 7: 


                                 11/11/21 03:16





                                 11/11/21 03:16


Labs: 


                  Abnormal Lab Results - Last 24 Hours (Table)











  11/10/21 11/10/21 11/10/21 Range/Units





  17:26 17:26 17:26 


 


WBC     (3.8-10.6)  k/uL


 


RBC     (4.30-5.90)  m/uL


 


MCV  100.8 H    (80.0-100.0)  fL


 


D-Dimer   0.71 H   (<0.60)  mg/L FEU


 


Sodium     (137-145)  mmol/L


 


BUN    5 L  (9-20)  mg/dL


 


Creatinine     (0.66-1.25)  mg/dL


 


Glucose    102 H  (74-99)  mg/dL


 


Serum Alcohol    223 H*  mg/dL














  11/11/21 11/11/21 Range/Units





  03:16 03:16 


 


WBC  11.6 H   (3.8-10.6)  k/uL


 


RBC  4.28 L   (4.30-5.90)  m/uL


 


MCV  101.8 H   (80.0-100.0)  fL


 


D-Dimer    (<0.60)  mg/L FEU


 


Sodium   136 L  (137-145)  mmol/L


 


BUN   3 L  (9-20)  mg/dL


 


Creatinine   0.59 L  (0.66-1.25)  mg/dL


 


Glucose    (74-99)  mg/dL


 


Serum Alcohol    mg/dL

## 2021-11-11 NOTE — P.DS
Providers


Date of admission: 


11/11/21 09:29





Expected date of discharge: 11/11/21


Attending physician: 


Charly Colon





Consults: 





                                        





11/11/21 08:27


Consult Physician Routine 


   Consulting Provider: João Graf


   Consult Reason/Comments: chest pain hx of 2 mi's in past


   Do you want consulting provider notified?: Yes











[facility] 


[facility]





[facility]: 





Chief Complaint: Chest pain





This is a pleasant 6-year-old patient of Dr. Quan.  Patient long-standing 

smoker.  Has been drinking alcohol since the age of about 8.  Drinks about 612 

ounce beers average per day.  Works as a [employer]  Patient presented 

yesterday after feeling tired dizzy.  It was described as chest pressure but 

actually was complaining of discomfort in the epigastric area.  Patient did run 

out of his Protonix.  Has been having reflux symptoms.  No precordial pain.  

Patient's alcohol level in the ER was 223.  Patient only eats one meal a day.  

He was having early alcohol withdrawal symptoms.  Was put on Valium and CIWA 

scale in the ER.  This morning feeling better.  Very slight tremors.  Able to 

tolerate some breakfast.  No chest pain.


Patient is counseled at length.  Valium discontinued.  Seen by oncology.  

Cleared for discharge.  Patient had declined to take any medications for alcohol

including naloxone, disulfiram.  Agreeable to nicotine patch.





Consultation:


Dr. Graf from cardiology





Past medical history to include:


Asthma, hypertension questionable, questionable MI.





Social history:


Lives with his sister and mother.  Works as a ".  Smokes a pack a day 

for about 28 years.  Has been drinking since the age of 8 about 6 beers a day 

of 12 ounce.





Family history:


Reviewed, noncontributory to presentation





Physical examination:


VITAL SIGNS: 98.5, 124, 20, 140/85, 99% room air upon presentation


GENERAL: BMI 24, sitting up in bed, awake.


EYES: Pupils equal.  Conjunctiva normal.


HEENT: External appearance of nose and ears normal, oral cavity grossly normal.


NECK: JVD not raised; masses not palpable.


HEART: First and second heart sounds are normal;  no edema.  


LUNGS: Respiratory rate normal; clear to auscultation.  


ABDOMEN: Soft,  nontender, liver spleen not palpable, no masses palpable.  


PSYCH: Alert and oriented x3;  mood  and affect slightly anxiousl.  








INVESTIGATIONS, reviewed in the clinical context:


2-D echocardiogram: EF 55-60%.  Borderline concentric LVH.


VQ scan: Low probability for PE


White count 9.81 was 16.3 platelets 24 potassium 4 BUN 5 creatinine 0.67


Troponin I 3 negative


Lipase 157


UA: Negative


Serum alcohol 223


Coronavirus [PCL]: Not detected


EKG tracing personally reviewed by me-normal sinus rhythm.


Chest x-ray film personally reviewed by me-no infiltrates


CT abdomen pelvis: Negative





Assessment and plan:





-Early alcohol withdrawal syndrome


Valium 2 mg 3 times a day.  CIWA scale.  Lopressor 12.5 by mouth twice a day to 

cut back on [address]


Valium discontinued.  3 days of Lopressor at the current dose.





-Acute alcohol intoxication with level of 223 on presentation





-Chronic alcohol dependence disorder


Patient counseled.  Patient declined to take any naloxone/disulfiram to go home.





-Chronic nicotine dependence, cigarette smoking


Nicotine patch 21





-GERD, uncontrolled


Protonix.  Avoid alcohol





Disposition:


Home





Plan - Discharge Summary


Discharge Rx Participant: No


New Discharge Prescriptions: 


New


   Nicotine 21Mg/24Hr Patch [Habitrol] 1 each TRANSDERM DAILY #14 patch


   Metoprolol Tartrate [Lopressor] 12.5 mg PO BID #6 tab


   Thiamine [Vitamin B-1] 100 mg PO BID-W/MEALS #60 tab





Continue


   Pantoprazole Sodium [Protonix] 40 mg PO DAILY


Discharge Medication List





Pantoprazole Sodium [Protonix] 40 mg PO DAILY 05/26/21 [History]


Metoprolol Tartrate [Lopressor] 12.5 mg PO BID #6 tab 11/11/21 [Rx]


Nicotine 21Mg/24Hr Patch [Habitrol] 1 each TRANSDERM DAILY #14 patch 11/11/21 

[Rx]


Thiamine [Vitamin B-1] 100 mg PO BID-W/MEALS #60 tab 11/11/21 [Rx]








Follow up Appointment(s)/Referral(s): 


cardiology, [Other] - 6 Weeks


João Graf MD [STAFF PHYSICIAN] - 12/20/21 3:00 pm


Arya Quan MD [Primary Care Provider] - 1-2 days (please call Dr quan for an 

apointment )


Patient Instructions/Handouts:  Chest Pain (GEN), At-Risk Alcohol Use (GEN), 

Acute Abdominal Pain (GEN)


Discharge Disposition: HOME SELF-CARE

## 2021-11-11 NOTE — NM
EXAMINATION TYPE: NM pul vent and perfuse

 

DATE OF EXAM: 11/11/2021

 

COMPARISON: Chest x-ray 11/10/2021

 

HISTORY: Elevated d-dimer, chest pain

 

TECHNIQUE:  Utilizing inhalation of 37.5 mCi Tc 99m DTPA aerosol and intravenous injection of 5.1 mCi
 of Tc 99m MAA, ventilation and perfusion images are acquired post injection in multiple projections.


 

FINDINGS: 

Radiotracer distribution on the perfusion images appears normal. Ventilation images appear unremarkab
le. No moderate or large mismatched defects are evident.

 

This exam is compared to the 11/10/2021 chest x-ray. No triple matched defects are evident.

 

IMPRESSION: 

Low probability for pulmonary embolism based on PIOPED 2 criteria.

## 2023-01-12 ENCOUNTER — HOSPITAL ENCOUNTER (OUTPATIENT)
Dept: HOSPITAL 47 - LABWHC1 | Age: 38
Discharge: HOME | End: 2023-01-12
Attending: FAMILY MEDICINE
Payer: COMMERCIAL

## 2023-01-12 DIAGNOSIS — Z00.00: Primary | ICD-10-CM

## 2023-01-12 LAB
ALBUMIN SERPL-MCNC: 4.1 G/DL (ref 3.8–4.9)
ALBUMIN/GLOB SERPL: 1.46 G/DL (ref 1.6–3.17)
ALP SERPL-CCNC: 78 U/L (ref 41–126)
ALT SERPL-CCNC: 33 U/L (ref 10–49)
AMYLASE SERPL-CCNC: 59 U/L (ref 23–121)
ANION GAP SERPL CALC-SCNC: 11.4 MMOL/L (ref 10–18)
AST SERPL-CCNC: 73 U/L (ref 14–35)
BASOPHILS # BLD AUTO: 0.12 X 10*3/UL (ref 0–0.1)
BASOPHILS NFR BLD AUTO: 1.5 %
BUN SERPL-SCNC: 6.3 MG/DL (ref 9–27)
BUN/CREAT SERPL: 7.88 RATIO (ref 12–20)
CALCIUM SPEC-MCNC: 9.2 MG/DL (ref 8.7–10.3)
CHLORIDE SERPL-SCNC: 97 MMOL/L (ref 96–109)
CO2 SERPL-SCNC: 25.6 MMOL/L (ref 20–27.5)
EOSINOPHIL # BLD AUTO: 0.22 X 10*3/UL (ref 0.04–0.35)
EOSINOPHIL NFR BLD AUTO: 2.7 %
ERYTHROCYTE [DISTWIDTH] IN BLOOD BY AUTOMATED COUNT: 4.41 X 10*6/UL (ref 4.4–5.6)
ERYTHROCYTE [DISTWIDTH] IN BLOOD: 13.5 % (ref 11.5–14.5)
GLOBULIN SER CALC-MCNC: 2.8 G/DL (ref 1.6–3.3)
GLUCOSE SERPL-MCNC: 79 MG/DL (ref 70–110)
HCT VFR BLD AUTO: 45.7 % (ref 39.6–50)
HGB BLD-MCNC: 14.9 G/DL (ref 13–17)
IMM GRANULOCYTES BLD QL AUTO: 0.5 %
LIPASE SERPL-CCNC: 61 U/L (ref 14–60)
LYMPHOCYTES # SPEC AUTO: 1.88 X 10*3/UL (ref 0.9–5)
LYMPHOCYTES NFR SPEC AUTO: 23 %
MCH RBC QN AUTO: 33.8 PG (ref 27–32)
MCHC RBC AUTO-ENTMCNC: 32.6 G/DL (ref 32–37)
MCV RBC AUTO: 103.6 FL (ref 80–97)
MONOCYTES # BLD AUTO: 0.81 X 10*3/UL (ref 0.2–1)
MONOCYTES NFR BLD AUTO: 9.9 %
NEUTROPHILS # BLD AUTO: 5.09 X 10*3/UL (ref 1.8–7.7)
NEUTROPHILS NFR BLD AUTO: 62.4 %
NRBC BLD AUTO-RTO: 0 /100 WBCS (ref 0–0)
PLATELET # BLD AUTO: 116 X 10*3/UL (ref 140–440)
POTASSIUM SERPL-SCNC: 3.6 MMOL/L (ref 3.5–5.5)
PROT SERPL-MCNC: 6.9 G/DL (ref 6.2–8.2)
SODIUM SERPL-SCNC: 134 MMOL/L (ref 135–145)
WBC # BLD AUTO: 8.16 X 10*3/UL (ref 4.5–10)

## 2023-01-12 PROCEDURE — 86780 TREPONEMA PALLIDUM: CPT

## 2023-01-12 PROCEDURE — 85025 COMPLETE CBC W/AUTO DIFF WBC: CPT

## 2023-01-12 PROCEDURE — 80053 COMPREHEN METABOLIC PANEL: CPT

## 2023-01-12 PROCEDURE — 36415 COLL VENOUS BLD VENIPUNCTURE: CPT

## 2023-01-12 PROCEDURE — 82150 ASSAY OF AMYLASE: CPT

## 2023-01-12 PROCEDURE — 84443 ASSAY THYROID STIM HORMONE: CPT

## 2023-01-12 PROCEDURE — 83690 ASSAY OF LIPASE: CPT

## 2023-01-24 ENCOUNTER — HOSPITAL ENCOUNTER (EMERGENCY)
Dept: HOSPITAL 47 - EC | Age: 38
Discharge: HOME | End: 2023-01-24
Payer: COMMERCIAL

## 2023-01-24 VITALS — HEART RATE: 78 BPM | SYSTOLIC BLOOD PRESSURE: 110 MMHG | DIASTOLIC BLOOD PRESSURE: 70 MMHG

## 2023-01-24 VITALS — RESPIRATION RATE: 18 BRPM | TEMPERATURE: 97.7 F

## 2023-01-24 DIAGNOSIS — J45.909: ICD-10-CM

## 2023-01-24 DIAGNOSIS — F17.200: ICD-10-CM

## 2023-01-24 DIAGNOSIS — I10: ICD-10-CM

## 2023-01-24 DIAGNOSIS — R10.12: Primary | ICD-10-CM

## 2023-01-24 DIAGNOSIS — I25.2: ICD-10-CM

## 2023-01-24 DIAGNOSIS — F10.129: ICD-10-CM

## 2023-01-24 LAB
ALBUMIN SERPL-MCNC: 4 G/DL (ref 3.5–5)
ALP SERPL-CCNC: 73 U/L (ref 38–126)
ALT SERPL-CCNC: 36 U/L (ref 4–49)
AMYLASE SERPL-CCNC: 112 U/L (ref 30–110)
ANION GAP SERPL CALC-SCNC: 12 MMOL/L
AST SERPL-CCNC: 83 U/L (ref 17–59)
BASOPHILS # BLD AUTO: 0.1 K/UL (ref 0–0.2)
BASOPHILS NFR BLD AUTO: 2 %
BUN SERPL-SCNC: 3 MG/DL (ref 9–20)
CALCIUM SPEC-MCNC: 8.8 MG/DL (ref 8.4–10.2)
CHLORIDE SERPL-SCNC: 110 MMOL/L (ref 98–107)
CO2 SERPL-SCNC: 24 MMOL/L (ref 22–30)
EOSINOPHIL # BLD AUTO: 0.3 K/UL (ref 0–0.7)
EOSINOPHIL NFR BLD AUTO: 5 %
ERYTHROCYTE [DISTWIDTH] IN BLOOD BY AUTOMATED COUNT: 4.28 M/UL (ref 4.3–5.9)
ERYTHROCYTE [DISTWIDTH] IN BLOOD: 12.8 % (ref 11.5–15.5)
GLUCOSE SERPL-MCNC: 94 MG/DL (ref 74–99)
HCT VFR BLD AUTO: 43.4 % (ref 39–53)
HGB BLD-MCNC: 14.2 GM/DL (ref 13–17.5)
LIPASE SERPL-CCNC: 625 U/L (ref 23–300)
LYMPHOCYTES # SPEC AUTO: 3 K/UL (ref 1–4.8)
LYMPHOCYTES NFR SPEC AUTO: 43 %
MAGNESIUM SPEC-SCNC: 1.5 MG/DL (ref 1.6–2.3)
MCH RBC QN AUTO: 33.2 PG (ref 25–35)
MCHC RBC AUTO-ENTMCNC: 32.8 G/DL (ref 31–37)
MCV RBC AUTO: 101.4 FL (ref 80–100)
MONOCYTES # BLD AUTO: 0.3 K/UL (ref 0–1)
MONOCYTES NFR BLD AUTO: 5 %
NEUTROPHILS # BLD AUTO: 2.9 K/UL (ref 1.3–7.7)
NEUTROPHILS NFR BLD AUTO: 42 %
PH UR: 6 [PH] (ref 5–8)
PLATELET # BLD AUTO: 159 K/UL (ref 150–450)
POTASSIUM SERPL-SCNC: 3.5 MMOL/L (ref 3.5–5.1)
PROT SERPL-MCNC: 7.3 G/DL (ref 6.3–8.2)
SODIUM SERPL-SCNC: 146 MMOL/L (ref 137–145)
SP GR UR: 1 (ref 1–1.03)
UROBILINOGEN UR QL STRIP: <2 MG/DL (ref ?–2)
WBC # BLD AUTO: 7 K/UL (ref 3.8–10.6)

## 2023-01-24 PROCEDURE — 99285 EMERGENCY DEPT VISIT HI MDM: CPT

## 2023-01-24 PROCEDURE — 80320 DRUG SCREEN QUANTALCOHOLS: CPT

## 2023-01-24 PROCEDURE — 74177 CT ABD & PELVIS W/CONTRAST: CPT

## 2023-01-24 PROCEDURE — 83605 ASSAY OF LACTIC ACID: CPT

## 2023-01-24 PROCEDURE — 83735 ASSAY OF MAGNESIUM: CPT

## 2023-01-24 PROCEDURE — 82150 ASSAY OF AMYLASE: CPT

## 2023-01-24 PROCEDURE — 83690 ASSAY OF LIPASE: CPT

## 2023-01-24 PROCEDURE — 80053 COMPREHEN METABOLIC PANEL: CPT

## 2023-01-24 PROCEDURE — 85025 COMPLETE CBC W/AUTO DIFF WBC: CPT

## 2023-01-24 PROCEDURE — 81003 URINALYSIS AUTO W/O SCOPE: CPT

## 2023-01-24 PROCEDURE — 36415 COLL VENOUS BLD VENIPUNCTURE: CPT

## 2023-01-24 PROCEDURE — 96374 THER/PROPH/DIAG INJ IV PUSH: CPT

## 2023-01-24 NOTE — CT
EXAMINATION TYPE: CT abdomen pelvis w con

 

DATE OF EXAM: 1/24/2023

 

COMPARISON: 11/10/2021

 

HISTORY: Abdominal pain

 

CT DLP: 760.7 mGycm

Automated exposure control for dose reduction was used.

 

CONTRAST: 

Performed with IV Contrast, patient injected with 100ml mL of Isovue 300.

 

 

Images obtained from the diaphragm to the floor of the pelvis with the IV contrast.

 

The lung bases show mild subsegmental atelectasis. Heart appears normal. No pericardial effusion. No 
pleural effusion. There is diffuse fatty replacement of the liver. Spleen is intact. No evidence of p
ancreatic mass. The stomach is intact. Gallbladder appears normal. The bile ducts are not dilated.

 

There is no adrenal mass. Kidneys show satisfactory contrast opacification. No hydronephrosis. Delaye
d images show normal renal excretion. No retroperitoneal adenopathy. The bladder distends smoothly. N
o inguinal hernia. No free fluid in the pelvis. No pelvic mass. Appendix is posterior and appears nor
mal.

 

There is no mesenteric edema. No ascites or free air. No sign of a bowel obstruction.

 

The lumbar vertebrae have normal alignment. Posterior elements are intact. No compression fracture. T
he bony pelvis is intact. The hip joints are intact.

 

IMPRESSION:

Normal appendix. Mild fatty infiltration of the liver. No acute abnormality within the abdomen pelvis
. No adverse change compared to old exam.

## 2023-01-24 NOTE — ED
Abdominal Pain HPI





- General


Chief Complaint: Abdominal Pain


Stated Complaint: Abdominal pain, Fall


Time Seen by Provider: 23 00:21


Source: patient


Mode of arrival: ambulatory


Limitations: no limitations





- History of Present Illness


Initial Comments: 





This patient is a 37-year-old man who has 2 complaints tonight.  The patient had

called an ambulance about left upper quadrant pain that is been going on for 

close to 4 months.  He describes it as aching, moderately severe, and it is 

usually present until he drinks about 3-4 shots of vodka then it goes away for a

while.  The patient has not noted other changes.  No change in bowel movements 

or urination.  He does have some intermittent nausea.  Tonight while the patient

was waiting for the ambulance, he slipped and fell sliding down stairs in the 

house.  He states that this resulted in some mid back pain area he states he did

not hit his head or neck.  He denies loss consciousness.  No extremity pain.


MD Complaint: abdominal pain


Onset/Timin


-: month(s)


Location: [facility]


Radiation: none


Migration to: no migration


Severity: moderate


Quality: aching


Consistency: intermittent


Improves With: other (Vodka)


Worsens With: nothing





- Related Data


                                Home Medications











 Medication  Instructions  Recorded  Confirmed


 


Pantoprazole Sodium [Protonix] 40 mg PO DAILY 05/26/21 11/10/21








                                  Previous Rx's











 Medication  Instructions  Recorded


 


Metoprolol Tartrate [Lopressor] 12.5 mg PO BID #6 tab 21


 


Nicotine 21Mg/24Hr Patch [Habitrol] 1 each TRANSDERM DAILY #14 patch 21


 


Thiamine [Vitamin B-1] 100 mg PO BID-W/MEALS #60 tab 21











                                    Allergies











Allergy/AdvReac Type Severity Reaction Status Date / Time


 


No Known Allergies Allergy   Verified 23 00:02














Review of Systems


ROS Statement: 


Those systems with pertinent positive or pertinent negative responses have been 

documented in the HPI.





ROS Other: All systems not noted in ROS Statement are negative.


Constitutional: Denies: fever, chills


Eyes: Denies: vision change


Respiratory: Denies: cough, dyspnea


Cardiovascular: Denies: chest pain, palpitations, orthopnea, syncope


Gastrointestinal: Reports: as per HPI, abdominal pain, nausea.  Denies: 

vomiting, diarrhea, constipation, melena, hematochezia


Genitourinary: Denies: dysuria, frequency, hematuria, testicular pain


Musculoskeletal: Reports: as per HPI, back pain


Skin: Denies: rash


Neurological: Denies: headache, weakness, numbness, confusion


Hematological/Lymphatic: Denies: easy bleeding





Past Medical History


Past Medical History: Asthma, Hypertension, Myocardial Infarction (MI)


Additional Past Medical History / Comment(s): back pain, liver failure


Last Myocardial Infarction Date:: 


History of Any Multi-Drug Resistant Organisms: None Reported


Past Surgical History: No Surgical Hx Reported


Additional Past Surgical History / Comment(s): skin graft as child


Past Psychological History: No Psychological Hx Reported


Smoking Status: Current every day smoker


Past Alcohol Use History: Daily, Heavy


Past Drug Use History: None Reported





General Exam


Limitations: no limitations


General appearance: alert, in no apparent distress


Head exam: Present: atraumatic, normocephalic


Eye exam: Present: normal appearance, PERRL, EOMI.  Absent: scleral icterus, 

conjunctival injection


ENT exam: Present: mucous membranes dry


Neck exam: Present: normal inspection, full ROM.  Absent: tenderness


Respiratory exam: Present: normal lung sounds bilaterally.  Absent: respiratory 

distress, wheezes, rales, rhonchi, stridor, chest wall tenderness


Cardiovascular Exam: Present: regular rate, normal rhythm, normal heart sounds. 

Absent: systolic murmur, diastolic murmur, rubs, gallop


GI/Abdominal exam: Present: soft, tenderness (Mild left upper quadrant 

tenderness, no rebound or guarding).  Absent: distended, guarding, rebound, 

rigid, mass, pulsatile mass


Extremities exam: Present: normal inspection, normal capillary refill.  Absent: 

pedal edema, calf tenderness


Back exam: Present: normal inspection.  Absent: CVA tenderness (R), CVA 

tenderness (L)


Neurological exam: Present: alert


Skin exam: Present: warm, dry, intact, normal color.  Absent: rash





Course


                                   Vital Signs











  23





  00:02 04:03


 


Temperature 97.7 F 


 


Pulse Rate 98 78


 


Respiratory 18 18





Rate  


 


Blood Pressure 148/98 110/70


 


O2 Sat by Pulse 98 98





Oximetry  














Medical Decision Making





- Medical Decision Making





This patient is a 37-year-old man presenting here to have evaluation of left 

upper quadrant pain.  From the physical exam, the abdomen does not show any 

evident peritonitis or surgical condition.  Computed tomography scan of the 

abdomen was obtained which was interpreted by myself as not showing free air, 

bowel obstruction, or kidney stone.  I discussed the findings with patient and 

with his sister who is here with him.  Discussed further options including 

admission to have consultation with GI/surgery, but the patient declines.  He 

states he feels much more comfortable at home and he will follow up as 

outpatient.  We discussed alcohol use and recommended to markedly decrease his 

alcohol intake.  The patient states that he understands and will work on this.  

Return parameters discussed.





Was pt. sent in by a medical professional or institution?


@  -No


Did you speak to anyone other than the patient for history?  


@  -[Patient's sister


Did you review nursing and triage notes? 


@  -[agree 


Were old charts reviewed? 


@  -[


Differential Diagnosis? 


@  -Differential Abdominal Pain Men:


Appendicitis, cholecystitis, diverticulosis, ischemic bowel, pancreatitis, 

hepatitis, UTI, gastroenteritis, AAA, incarcerated hernia, bowel obstruction, 

constipation, inflammatory bowel, hepatitis, peptic ulcer disease, splenic 

infarction, perforated viscus, testicular torsion, this is not meant to be an 

all-inclusive list








EKG interpreted by me (3pts min.)?


@  -[None


X-rays interpreted by me (1pt min.)?


@  -


CT interpreted by me (1pt min.)?


@  -See chart


U/S interpreted by me (1pt. min.)?


@  -[none]


What testing was considered but not performed? (CT, X-rays, U/S, labs)? Why?


@  [


What meds were considered but not given? Why?


@  -[none]


Did you discuss the management of the patient with other professionals?


@  -[No


Did you reconcile home meds?


@  -[


Was smoking cessation discussed for >3mins.?


@  -[yes


Was critical care preformed (if so, how long)?


@  -[none]


Were there social determinants of health that impacted care today? How? 

(Homelessness, low income, unemployed, alcoholism, drug addiction, 

transportation, low edu. Level, literacy, decrease access to med. care, long term, 

rehab)?


@  -[Alcohol abuse


Was there de-escalation of care discussed even if they declined? (Discuss DNR or

 withdrawal of care, Hospice)?


@  -[No


What co-morbidities impacted this encounter? (DM, HTN, Smoking, COPD, CAD, 

Cancer, CVA, Hep., AIDS, mental health diagnosis, sleep apnea, morbid obesity)?


@  -[Alcohol abuse


Was patient admitted / discharged?


@  -[Discharged


Undiagnosed new problem with uncertain prognosis?


@  -[none]


Drug Therapy requiring intensive monitoring for toxicity (Heparin, Nitro, 

Insulin, Cardizem)?


@  -[none]


Were any procedures done?


@  -[none]


Diagnosis/symptom?


@  -[Acute abdominal pain


Acute, or Chronic, or Acute on Chronic?


@  -[Acute on chronic


Uncomplicated (without systemic symptoms) or Complicated (systemic symptoms)?


@  -[Uncomplicated


Side effects of treatment?


@  -[none]


Exacerbation, Progression, or Severe Exacerbation]


@  -[no]


Poses a threat to life or bodily function?


@  -[No





- Lab Data


Result diagrams: 


                                 23 01:29





                                 23 00:39


                                   Lab Results











  23 Range/Units





  00:39 00:39 01:18 


 


WBC     (3.8-10.6)  k/uL


 


RBC     (4.30-5.90)  m/uL


 


Hgb     (13.0-17.5)  gm/dL


 


Hct     (39.0-53.0)  %


 


MCV     (80.0-100.0)  fL


 


MCH     (25.0-35.0)  pg


 


MCHC     (31.0-37.0)  g/dL


 


RDW     (11.5-15.5)  %


 


Plt Count     (150-450)  k/uL


 


MPV     


 


Neutrophils %     %


 


Lymphocytes %     %


 


Monocytes %     %


 


Eosinophils %     %


 


Basophils %     %


 


Neutrophils #     (1.3-7.7)  k/uL


 


Lymphocytes #     (1.0-4.8)  k/uL


 


Monocytes #     (0-1.0)  k/uL


 


Eosinophils #     (0-0.7)  k/uL


 


Basophils #     (0-0.2)  k/uL


 


Sodium  146 H    (137-145)  mmol/L


 


Potassium  3.5    (3.5-5.1)  mmol/L


 


Chloride  110 H    ()  mmol/L


 


Carbon Dioxide  24    (22-30)  mmol/L


 


Anion Gap  12    mmol/L


 


BUN  3 L    (9-20)  mg/dL


 


Creatinine  0.68    (0.66-1.25)  mg/dL


 


Est GFR (CKD-EPI)AfAm  >90    (>60 ml/min/1.73 sqM)  


 


Est GFR (CKD-EPI)NonAf  >90    (>60 ml/min/1.73 sqM)  


 


Glucose  94    (74-99)  mg/dL


 


Lactic Ac Sepsis Rflx     


 


Plasma Lactic Acid [place]   2.5 H*   (0.7-2.0)  mmol/L


 


Calcium  8.8    (8.4-10.2)  mg/dL


 


Magnesium  1.5 L    (1.6-2.3)  mg/dL


 


Total Bilirubin  0.4    (0.2-1.3)  mg/dL


 


AST  83 H    (17-59)  U/L


 


ALT  36    (4-49)  U/L


 


Alkaline Phosphatase  73    ()  U/L


 


Total Protein  7.3    (6.3-8.2)  g/dL


 


Albumin  4.0    (3.5-5.0)  g/dL


 


Amylase  112 H    ()  U/L


 


Lipase  625 H    ()  U/L


 


Urine Color    Colorless  


 


Urine Appearance    Clear  (Clear)  


 


Urine pH    6.0  (5.0-8.0)  


 


Ur Specific Gravity    1.002  (1.001-1.035)  


 


Urine Protein    Negative  (Negative)  


 


Urine Glucose (UA)    Negative  (Negative)  


 


Urine Ketones    Negative  (Negative)  


 


Urine Blood    Negative  (Negative)  


 


Urine Nitrite    Negative  (Negative)  


 


Urine Bilirubin    Negative  (Negative)  


 


Urine Urobilinogen    <2.0  (<2.0)  mg/dL


 


Ur Leukocyte Esterase    Negative  (Negative)  


 


Serum Alcohol  460 H*    mg/dL














  23 Range/Units





  01:29 02:39 


 


WBC  7.0   (3.8-10.6)  k/uL


 


RBC  4.28 L   (4.30-5.90)  m/uL


 


Hgb  14.2   (13.0-17.5)  gm/dL


 


Hct  43.4   (39.0-53.0)  %


 


MCV  101.4 H   (80.0-100.0)  fL


 


MCH  33.2   (25.0-35.0)  pg


 


MCHC  32.8   (31.0-37.0)  g/dL


 


RDW  12.8   (11.5-15.5)  %


 


Plt Count  159   (150-450)  k/uL


 


MPV  8.0   


 


Neutrophils %  42   %


 


Lymphocytes %  43   %


 


Monocytes %  5   %


 


Eosinophils %  5   %


 


Basophils %  2   %


 


Neutrophils #  2.9   (1.3-7.7)  k/uL


 


Lymphocytes #  3.0   (1.0-4.8)  k/uL


 


Monocytes #  0.3   (0-1.0)  k/uL


 


Eosinophils #  0.3   (0-0.7)  k/uL


 


Basophils #  0.1   (0-0.2)  k/uL


 


Sodium    (137-145)  mmol/L


 


Potassium    (3.5-5.1)  mmol/L


 


Chloride    ()  mmol/L


 


Carbon Dioxide    (22-30)  mmol/L


 


Anion Gap    mmol/L


 


BUN    (9-20)  mg/dL


 


Creatinine    (0.66-1.25)  mg/dL


 


Est GFR (CKD-EPI)AfAm    (>60 ml/min/1.73 sqM)  


 


Est GFR (CKD-EPI)NonAf    (>60 ml/min/1.73 sqM)  


 


Glucose    (74-99)  mg/dL


 


Lactic Ac Sepsis Rflx   Y  


 


Plasma Lactic Acid [place]    (0.7-2.0)  mmol/L


 


Calcium    (8.4-10.2)  mg/dL


 


Magnesium    (1.6-2.3)  mg/dL


 


Total Bilirubin    (0.2-1.3)  mg/dL


 


AST    (17-59)  U/L


 


ALT    (4-49)  U/L


 


Alkaline Phosphatase    ()  U/L


 


Total Protein    (6.3-8.2)  g/dL


 


Albumin    (3.5-5.0)  g/dL


 


Amylase    ()  U/L


 


Lipase    ()  U/L


 


Urine Color    


 


Urine Appearance    (Clear)  


 


Urine pH    (5.0-8.0)  


 


Ur Specific Gravity    (1.001-1.035)  


 


Urine Protein    (Negative)  


 


Urine Glucose (UA)    (Negative)  


 


Urine Ketones    (Negative)  


 


Urine Blood    (Negative)  


 


Urine Nitrite    (Negative)  


 


Urine Bilirubin    (Negative)  


 


Urine Urobilinogen    (<2.0)  mg/dL


 


Ur Leukocyte Esterase    (Negative)  


 


Serum Alcohol    mg/dL














Disposition


Clinical Impression: 


 Abdominal pain, Alcohol intoxication





Disposition: HOME SELF-CARE


Condition: Fair


Instructions (If sedation given, give patient instructions):  Abdominal Pain 

(ED), Alcohol Use Disorder (ED)


Is patient prescribed a controlled substance at d/c from ED?: No


Referrals: 


None,Stated [REFERRING] - 1-2 days


Wanda Rasmussen MD [STAFF PHYSICIAN] - 1-2 days